# Patient Record
Sex: FEMALE | Race: WHITE | NOT HISPANIC OR LATINO
[De-identification: names, ages, dates, MRNs, and addresses within clinical notes are randomized per-mention and may not be internally consistent; named-entity substitution may affect disease eponyms.]

---

## 2020-01-04 ENCOUNTER — TRANSCRIPTION ENCOUNTER (OUTPATIENT)
Age: 85
End: 2020-01-04

## 2024-04-05 ENCOUNTER — APPOINTMENT (OUTPATIENT)
Dept: ORTHOPEDIC SURGERY | Facility: CLINIC | Age: 89
End: 2024-04-05

## 2025-06-13 ENCOUNTER — EMERGENCY (EMERGENCY)
Facility: HOSPITAL | Age: 89
LOS: 1 days | End: 2025-06-13
Attending: EMERGENCY MEDICINE
Payer: MEDICARE

## 2025-06-13 VITALS
OXYGEN SATURATION: 94 % | DIASTOLIC BLOOD PRESSURE: 84 MMHG | HEIGHT: 62 IN | TEMPERATURE: 98 F | WEIGHT: 149.91 LBS | HEART RATE: 67 BPM | SYSTOLIC BLOOD PRESSURE: 156 MMHG | RESPIRATION RATE: 16 BRPM

## 2025-06-13 PROCEDURE — 99284 EMERGENCY DEPT VISIT MOD MDM: CPT

## 2025-06-13 PROCEDURE — 93010 ELECTROCARDIOGRAM REPORT: CPT

## 2025-06-13 NOTE — ED ADULT TRIAGE NOTE - CHIEF COMPLAINT QUOTE
BIBEMS c/o hypertension for several days. Daughter reports pt has had high blood pressure despite taking hypertension medications. BEFAST Neg, per EMS and daughter walking on scene with no difficulty. No neuro symptoms present. Denies any chest pain, dizziness, n/v/d, recent falls. Also complaining of frontal region headache.

## 2025-06-13 NOTE — ED ADULT NURSE NOTE - ALCOHOL PRE SCREEN (AUDIT - C)
Tried returning call to this patient to discuss scheduled Neurology appointment of 9-, but caller is not accepting calls at this time.   
Statement Selected

## 2025-06-13 NOTE — ED ADULT TRIAGE NOTE - STATUS:
Applied [Chaperone Present] : A chaperone was present in the examining room during all aspects of the physical examination [FreeTextEntry1] : SM

## 2025-06-13 NOTE — ED ADULT NURSE NOTE - OBJECTIVE STATEMENT
Patient complaining of HTN, headache and dizziness. SBP in 160s patient takes losartan for bp that has not been effective.

## 2025-06-14 VITALS
OXYGEN SATURATION: 95 % | SYSTOLIC BLOOD PRESSURE: 166 MMHG | TEMPERATURE: 97 F | RESPIRATION RATE: 18 BRPM | DIASTOLIC BLOOD PRESSURE: 85 MMHG | HEART RATE: 70 BPM

## 2025-06-14 LAB
ALBUMIN SERPL ELPH-MCNC: 3.1 G/DL — LOW (ref 3.5–5)
ALP SERPL-CCNC: 86 U/L — SIGNIFICANT CHANGE UP (ref 40–120)
ALT FLD-CCNC: 32 U/L DA — SIGNIFICANT CHANGE UP (ref 10–60)
ANION GAP SERPL CALC-SCNC: 7 MMOL/L — SIGNIFICANT CHANGE UP (ref 5–17)
APPEARANCE UR: CLEAR — SIGNIFICANT CHANGE UP
AST SERPL-CCNC: 25 U/L — SIGNIFICANT CHANGE UP (ref 10–40)
BACTERIA # UR AUTO: ABNORMAL /HPF
BASOPHILS # BLD AUTO: 0.04 K/UL — SIGNIFICANT CHANGE UP (ref 0–0.2)
BASOPHILS NFR BLD AUTO: 0.4 % — SIGNIFICANT CHANGE UP (ref 0–2)
BILIRUB SERPL-MCNC: 0.5 MG/DL — SIGNIFICANT CHANGE UP (ref 0.2–1.2)
BILIRUB UR-MCNC: NEGATIVE — SIGNIFICANT CHANGE UP
BUN SERPL-MCNC: 15 MG/DL — SIGNIFICANT CHANGE UP (ref 7–18)
CALCIUM SERPL-MCNC: 9.1 MG/DL — SIGNIFICANT CHANGE UP (ref 8.4–10.5)
CHLORIDE SERPL-SCNC: 104 MMOL/L — SIGNIFICANT CHANGE UP (ref 96–108)
CO2 SERPL-SCNC: 28 MMOL/L — SIGNIFICANT CHANGE UP (ref 22–31)
COLOR SPEC: YELLOW — SIGNIFICANT CHANGE UP
CREAT SERPL-MCNC: 0.95 MG/DL — SIGNIFICANT CHANGE UP (ref 0.5–1.3)
DIFF PNL FLD: NEGATIVE — SIGNIFICANT CHANGE UP
EGFR: 57 ML/MIN/1.73M2 — LOW
EGFR: 57 ML/MIN/1.73M2 — LOW
EOSINOPHIL # BLD AUTO: 0.1 K/UL — SIGNIFICANT CHANGE UP (ref 0–0.5)
EOSINOPHIL NFR BLD AUTO: 0.9 % — SIGNIFICANT CHANGE UP (ref 0–6)
EPI CELLS # UR: PRESENT
GLUCOSE SERPL-MCNC: 129 MG/DL — HIGH (ref 70–99)
GLUCOSE UR QL: NEGATIVE MG/DL — SIGNIFICANT CHANGE UP
HCT VFR BLD CALC: 40.3 % — SIGNIFICANT CHANGE UP (ref 34.5–45)
HCV AB S/CO SERPL IA: 14.19 S/CO — HIGH (ref 0–0.79)
HCV AB SERPL-IMP: REACTIVE
HGB BLD-MCNC: 13.1 G/DL — SIGNIFICANT CHANGE UP (ref 11.5–15.5)
HIV 1 & 2 AB SERPL IA.RAPID: SIGNIFICANT CHANGE UP
IMM GRANULOCYTES NFR BLD AUTO: 0.3 % — SIGNIFICANT CHANGE UP (ref 0–0.9)
KETONES UR QL: NEGATIVE MG/DL — SIGNIFICANT CHANGE UP
LEUKOCYTE ESTERASE UR-ACNC: ABNORMAL
LYMPHOCYTES # BLD AUTO: 38.9 % — SIGNIFICANT CHANGE UP (ref 13–44)
LYMPHOCYTES # BLD AUTO: 4.19 K/UL — HIGH (ref 1–3.3)
MCHC RBC-ENTMCNC: 27.8 PG — SIGNIFICANT CHANGE UP (ref 27–34)
MCHC RBC-ENTMCNC: 32.5 G/DL — SIGNIFICANT CHANGE UP (ref 32–36)
MCV RBC AUTO: 85.6 FL — SIGNIFICANT CHANGE UP (ref 80–100)
MONOCYTES # BLD AUTO: 0.79 K/UL — SIGNIFICANT CHANGE UP (ref 0–0.9)
MONOCYTES NFR BLD AUTO: 7.3 % — SIGNIFICANT CHANGE UP (ref 2–14)
NEUTROPHILS # BLD AUTO: 5.63 K/UL — SIGNIFICANT CHANGE UP (ref 1.8–7.4)
NEUTROPHILS NFR BLD AUTO: 52.2 % — SIGNIFICANT CHANGE UP (ref 43–77)
NITRITE UR-MCNC: NEGATIVE — SIGNIFICANT CHANGE UP
NRBC BLD AUTO-RTO: 0 /100 WBCS — SIGNIFICANT CHANGE UP (ref 0–0)
PH UR: 6 — SIGNIFICANT CHANGE UP (ref 5–8)
PLATELET # BLD AUTO: 266 K/UL — SIGNIFICANT CHANGE UP (ref 150–400)
POTASSIUM SERPL-MCNC: 3.1 MMOL/L — LOW (ref 3.5–5.3)
POTASSIUM SERPL-SCNC: 3.1 MMOL/L — LOW (ref 3.5–5.3)
PROT SERPL-MCNC: 7.2 G/DL — SIGNIFICANT CHANGE UP (ref 6–8.3)
PROT UR-MCNC: NEGATIVE MG/DL — SIGNIFICANT CHANGE UP
RBC # BLD: 4.71 M/UL — SIGNIFICANT CHANGE UP (ref 3.8–5.2)
RBC # FLD: 14.1 % — SIGNIFICANT CHANGE UP (ref 10.3–14.5)
RBC CASTS # UR COMP ASSIST: 0 /HPF — SIGNIFICANT CHANGE UP (ref 0–4)
SODIUM SERPL-SCNC: 139 MMOL/L — SIGNIFICANT CHANGE UP (ref 135–145)
SP GR SPEC: 1.01 — SIGNIFICANT CHANGE UP (ref 1–1.03)
TROPONIN I, HIGH SENSITIVITY RESULT: 7.9 NG/L — SIGNIFICANT CHANGE UP
UROBILINOGEN FLD QL: 0.2 MG/DL — SIGNIFICANT CHANGE UP (ref 0.2–1)
WBC # BLD: 10.78 K/UL — HIGH (ref 3.8–10.5)
WBC # FLD AUTO: 10.78 K/UL — HIGH (ref 3.8–10.5)
WBC UR QL: 5 /HPF — SIGNIFICANT CHANGE UP (ref 0–5)

## 2025-06-14 PROCEDURE — 99284 EMERGENCY DEPT VISIT MOD MDM: CPT | Mod: 25

## 2025-06-14 PROCEDURE — 96374 THER/PROPH/DIAG INJ IV PUSH: CPT

## 2025-06-14 PROCEDURE — 80053 COMPREHEN METABOLIC PANEL: CPT

## 2025-06-14 PROCEDURE — 36415 COLL VENOUS BLD VENIPUNCTURE: CPT

## 2025-06-14 PROCEDURE — 96375 TX/PRO/DX INJ NEW DRUG ADDON: CPT

## 2025-06-14 PROCEDURE — 87086 URINE CULTURE/COLONY COUNT: CPT

## 2025-06-14 PROCEDURE — 86803 HEPATITIS C AB TEST: CPT

## 2025-06-14 PROCEDURE — 87521 HEPATITIS C PROBE&RVRS TRNSC: CPT

## 2025-06-14 PROCEDURE — 93005 ELECTROCARDIOGRAM TRACING: CPT

## 2025-06-14 PROCEDURE — 85025 COMPLETE CBC W/AUTO DIFF WBC: CPT

## 2025-06-14 PROCEDURE — 86703 HIV-1/HIV-2 1 RESULT ANTBDY: CPT

## 2025-06-14 PROCEDURE — 84484 ASSAY OF TROPONIN QUANT: CPT

## 2025-06-14 PROCEDURE — 81001 URINALYSIS AUTO W/SCOPE: CPT

## 2025-06-14 RX ORDER — METOCLOPRAMIDE HCL 10 MG
10 TABLET ORAL ONCE
Refills: 0 | Status: COMPLETED | OUTPATIENT
Start: 2025-06-14 | End: 2025-06-14

## 2025-06-14 RX ORDER — SODIUM CHLORIDE 9 G/1000ML
1000 INJECTION, SOLUTION INTRAVENOUS ONCE
Refills: 0 | Status: COMPLETED | OUTPATIENT
Start: 2025-06-14 | End: 2025-06-14

## 2025-06-14 RX ORDER — MAGNESIUM, ALUMINUM HYDROXIDE 200-200 MG
30 TABLET,CHEWABLE ORAL ONCE
Refills: 0 | Status: COMPLETED | OUTPATIENT
Start: 2025-06-14 | End: 2025-06-14

## 2025-06-14 RX ADMIN — Medication 40 MILLIEQUIVALENT(S): at 05:56

## 2025-06-14 RX ADMIN — Medication 10 MILLIGRAM(S): at 03:33

## 2025-06-14 RX ADMIN — Medication 40 MILLIGRAM(S): at 03:33

## 2025-06-14 RX ADMIN — SODIUM CHLORIDE 1000 MILLILITER(S): 9 INJECTION, SOLUTION INTRAVENOUS at 03:33

## 2025-06-14 RX ADMIN — Medication 30 MILLILITER(S): at 03:34

## 2025-06-14 NOTE — ED PROVIDER NOTE - CARE PLAN
Principal Discharge DX:	Hypertension   1 Principal Discharge DX:	Hypertension  Secondary Diagnosis:	Weakness   Palpitations

## 2025-06-14 NOTE — ED PROVIDER NOTE - NSFOLLOWUPINSTRUCTIONS_ED_ALL_ED_FT
My name is Dr. Sangeetha Harrison.  I am the American Board of Emergency Medicine (ABEM) Certified Emergency Physician who care for you today.      It has been a pleasure taking care of you.    *  I hope that you are feeling better and your symptoms have improved.     Please let a nurse or Patient Care Assocation (PCA) know to call me before you leave if    - If you are still in pain or still having the symptoms that brought you in  - Want further explanation about your lab/radiology findings  - Are uncertain of your plan of care after you leave   - Need a referral for a primary care doctor or specialist  - Need a work/school note for yourself or your (s)    I also hope that you felt   -we were caring   - that we treated you with respect,  - that we addressed your medical issues today  - that we explained the results of any testing we did  - we explained your diagnosis, if we were able to find one.    - we explained how and when to take your medications, if any were prescribed.  -  we explained what to do or who you should follow up with after you leave today.  - that all your questions were answered      I want to make sure you feel completely comfortable with your plan before you leave.    I'm happy to answer all the questions you might have: my goal is that you leave here comfortable with your health and that your pain has much improved, or resolved.     You may get in a survey in the mail.  If you have a moment, please take the time to fill it out and let us know how your experience was with me and in our ER at [facility].    I wish you Happy (and QUICK) Healing!      Your blood pressure here was     Since you missed your medication this morning, you were given amlodipine 5 mg by mouth.   Since you did not have chest pain, shortness of breath,  dysphagia, diplopia, dysarthria, weakness, numbness, tingling, We don't have to worry that you are having a heart attack or stroke.     Please resume your blood pressure medicine when possible and return if you have chest pain or any of the above symptoms       1. Don't stress out about your blood pressure.   2 DO NOT CHECK YOUR BLOOD PRESSURE EVERY DAY  3 Take your blood pressure at 3 random times in the next 2 weeks, when you are not stressed.    4. Make an appointment with your doctor for 2 weeks from now and bring them your log.  If they numbers are all high, he/she will talk to you about dietary changes for 6 months before starting you on medications.  If they are Very high, you may need to start medications soon or right away.   5. Start making changes today!      a. Dietary changes- cut down or cut out soda, chips, juices, cookies, extra salt, bread.     b. Exercise - Start walking 10 minutes a day, get up to a point where you are raising your heart rate 3 times a week.  Raising your heart rate means you cant have a conversation with a person next to you or on the phone. My name is Dr. Sangeetha Harrison.  I am the American Board of Emergency Medicine (ABEM) Certified Emergency Physician who care for you today.      It has been a pleasure taking care of you.   you do no  I hope that you are feeling better and your symptoms have improved.     Please let a nurse or Patient Care Assocation (PCA) know to call me before you leave if    - If you are still in pain or still having the symptoms that brought you in  - Want further explanation about your lab/radiology findings  - Are uncertain of your plan of care after you leave   - Need a referral for a primary care doctor or specialist  - Need a work/school note for yourself or your (s)    I also hope that you felt   -we were caring   - that we treated you with respect,  - that we addressed your medical issues today  - that we explained the results of any testing we did  - we explained your diagnosis, if we were able to find one.    - we explained how and when to take your medications, if any were prescribed.  -  we explained what to do or who you should follow up with after you leave today.  - that all your questions were answered      I want to make sure you feel completely comfortable with your plan before you leave.    I'm happy to answer all the questions you might have: my goal is that you leave here comfortable with your health and that your pain has much improved, or resolved.     You may get in a survey in the mail.  If you have a moment, please take the time to fill it out and let us know how your experience was with me and in our ER at [facility].    I wish you Happy (and QUICK) Healing!      Your blood pressure here was     Since you missed your medication this morning, you were given amlodipine 5 mg by mouth.   Since you did not have chest pain, shortness of breath,  dysphagia, diplopia, dysarthria, weakness, numbness, tingling, We don't have to worry that you are having a heart attack or stroke.     Please resume your blood pressure medicine when possible and return if you have chest pain or any of the above symptoms       1. Don't stress out about your blood pressure.   2 DO NOT CHECK YOUR BLOOD PRESSURE EVERY DAY  3 Take your blood pressure at 3 random times in the next 2 weeks, when you are not stressed.    4. Make an appointment with your doctor for 2 weeks from now and bring them your log.  If they numbers are all high, he/she will talk to you about dietary changes for 6 months before starting you on medications.  If they are Very high, you may need to start medications soon or right away.   5. Start making changes today!      a. Dietary changes- cut down or cut out soda, chips, juices, cookies, extra salt, bread.     b. Exercise - Start walking 10 minutes a day, get up to a point where you are raising your heart rate 3 times a week.  Raising your heart rate means you cant have a conversation with a person next to you or on the phone.       for your acid reflux, please continue Protonix and famotidine.  Please add Maalox 5 mg a day 3-4 times a day.   In addition please drink Ensure 2-3 times a day which will give your mother more energy and follow-up with her gastroenterologist as well as your cardiologist.

## 2025-06-14 NOTE — ED PROVIDER NOTE - OBJECTIVE STATEMENT
89 yo woman, accompanied by son,  line used, with history of HTN, GERD, dementia    Pt stat es she was feeling weak and had a headache so her son blood pressure which was 170 so he gave her an extra losartan and it didn't go dowh so they came to the ER.  Her BP was elevated 3 days ago as well,

## 2025-06-14 NOTE — ED PROVIDER NOTE - PHYSICAL EXAMINATION
General: Well appearing, no acute distress, appears stated age, sitting up and down in bed to interact with her son during history taking   HEENT: normocephalic, atraumatic   Respiratory: normal work of breathing  MSK: no swelling or tenderness of lower extremities, moving all extremities spontaneously   Skin: warm, dry  Neuro: A&Ox3, cranial nerves II-XII intact, 5/5 strength in all extremities, no sensory deficits, normal gait   Psych: appropriate affect

## 2025-06-14 NOTE — ED PROVIDER NOTE - PATIENT PORTAL LINK FT
You can access the FollowMyHealth Patient Portal offered by [facility] by registering at the following website: [email or web address]. By joining [facility]’s FollowMyHealth portal, you will also be able to view your health information using other applications (apps) compatible with our system.

## 2025-06-14 NOTE — ED PROVIDER NOTE - CLINICAL SUMMARY MEDICAL DECISION MAKING FREE TEXT BOX
patietn with htn here, will check labs, and likely dc   patient felt headache and weak so her son took her bp, it was 170s,   here 156/70 no distress, will dc

## 2025-06-15 LAB
CULTURE RESULTS: SIGNIFICANT CHANGE UP
SPECIMEN SOURCE: SIGNIFICANT CHANGE UP

## 2025-06-16 LAB
HCV RNA FLD QL NAA+PROBE: SIGNIFICANT CHANGE UP
HCV RNA SPEC QL PROBE+SIG AMP: DETECTED